# Patient Record
Sex: MALE | Employment: STUDENT
[De-identification: names, ages, dates, MRNs, and addresses within clinical notes are randomized per-mention and may not be internally consistent; named-entity substitution may affect disease eponyms.]

---

## 2024-06-04 ENCOUNTER — APPOINTMENT (OUTPATIENT)
Dept: RADIOLOGY | Facility: OTHER | Age: 24
End: 2024-06-04
Attending: FAMILY MEDICINE
Payer: OTHER MISCELLANEOUS

## 2024-06-04 DIAGNOSIS — M25.561 ACUTE PAIN OF RIGHT KNEE: ICD-10-CM

## 2024-06-04 PROCEDURE — 73564 X-RAY EXAM KNEE 4 OR MORE: CPT | Mod: TC,FY,RT | Performed by: RADIOLOGY

## 2024-06-13 DIAGNOSIS — M25.561 ACUTE PAIN OF RIGHT KNEE: ICD-10-CM

## 2024-06-17 ENCOUNTER — APPOINTMENT (OUTPATIENT)
Dept: RADIOLOGY | Facility: MEDICAL CENTER | Age: 24
End: 2024-06-17
Attending: FAMILY MEDICINE
Payer: OTHER MISCELLANEOUS

## 2024-06-17 PROCEDURE — 73721 MRI JNT OF LWR EXTRE W/O DYE: CPT | Mod: RT

## 2024-06-20 ENCOUNTER — OFFICE VISIT (OUTPATIENT)
Dept: MEDICAL GROUP | Facility: OTHER | Age: 24
End: 2024-06-20

## 2024-06-20 DIAGNOSIS — M17.11 OSTEOARTHRITIS OF RIGHT KNEE, UNSPECIFIED OSTEOARTHRITIS TYPE: ICD-10-CM

## 2024-06-20 PROCEDURE — 0232T NJX PLATELET PLASMA: CPT | Mod: GC | Performed by: STUDENT IN AN ORGANIZED HEALTH CARE EDUCATION/TRAINING PROGRAM

## 2024-06-20 NOTE — PROGRESS NOTES
Subjective:   Chas Monroe III is a 23 y.o. male here for the evaluation and management of No chief complaint on file.    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 He has been complaining of knee fullness and pain, and does have a large knee effusion. Reviewed treatment options with patient at last training room visit, and engaged in shared decision making regarding PRP, HA, and corticosteroid injections for management. Patient has had and responded well to PRP injections well in the past, and would like to proceed with PRP today.     ROS  As per HPI   No current outpatient medications on file.     No current facility-administered medications for this visit.     Allergies  Patient has no allergy information on record.    No past medical history on file.  There are no problems to display for this patient.      Past Surgical History  No past surgical history on file.    Social History     Socioeconomic History    Marital status: Single     Spouse name: Not on file    Number of children: Not on file    Years of education: Not on file    Highest education level: Not on file   Occupational History    Not on file   Tobacco Use    Smoking status: Not on file    Smokeless tobacco: Not on file   Substance and Sexual Activity    Alcohol use: Not on file    Drug use: Not on file    Sexual activity: Not on file   Other Topics Concern    Not on file   Social History Narrative    Not on file     Social Determinants of Health     Financial Resource Strain: Not on file   Food Insecurity: Not on file   Transportation Needs: Not on file   Physical Activity: Not on file   Stress: Not on file   Social Connections: Not on file   Intimate Partner Violence: Not on file   Housing Stability: Not on file        Objective:   There were no vitals filed for this visit.  There is no height or weight on file to calculate BMI.     Physical Exam  MSK: Large knee effusion on palpation.   POCUS:  Demonstrates large knee effusion.   Assessment and Plan:   Chas Monroe III is a 23 y.o. male with a No chief complaint on file.     The following was discussed with the patient today.    1. Osteoarthritis of right knee, unspecified osteoarthritis type    Problem List Items Addressed This Visit    None  Visit Diagnoses       Osteoarthritis of right knee, unspecified osteoarthritis type        Knee joint aspiration and PRP injection done. Tolerated well without complications. See note below.          Procedure Note    Procedure: R knee joint aspiration and PRP injection with ultrasound guidance  Performing physician: Riley Gamboa DO  Supervising physician: Spike Castillo MD  Injectate: 5.5cc of PRP    Details:  Patient provided verbal consent for procedure after discussion of risks and benefits. Utilizing ultrasound guidance, appropriate injection site was identified for superolateral approach. The area was then cleansed with chloraprep x3 in usual sterile fashion. A 25 gauge 1.5 in needle was then placed in superficial skin, and under ultrasound guidance 3cc of 2% lidocaine without epinephrine was administered. Needle was then removed, and site was re-cleansed with chloraprep. Once noting appropriate analgesia, an 18 gauge needle attached to empty 30cc syringe was then placed within joint space under ultrasound guidance, and then contents were aspirated. 30cc syringe was filled with serous fluid, removed, and replaced with a new syringe with an additional 10cc aspirated for a total of 40cc drained from the knee. Without removing the needle within the joint space, this syringe was removed, and a new syringe containing 5.5cc of PRP was then attached, and easily injected into the joint space under ultrasound guidance. The needle was removed, and then a bandage was applied. Aftercare was discussed. The patient tolerated the procedure well, and there were no complications. Images to be uploaded to PACS.     Riley Gamboa  DO  [employer] [facility]

## 2024-07-12 RX ORDER — LOPERAMIDE HYDROCHLORIDE 2 MG/1
2 CAPSULE ORAL 4 TIMES DAILY PRN
Qty: 30 CAPSULE | Refills: 0 | Status: SHIPPED | OUTPATIENT
Start: 2024-07-12

## 2024-07-12 RX ORDER — ONDANSETRON 4 MG/1
4 TABLET, ORALLY DISINTEGRATING ORAL EVERY 6 HOURS PRN
Qty: 10 TABLET | Refills: 0 | Status: SHIPPED | OUTPATIENT
Start: 2024-07-12

## 2024-07-26 DIAGNOSIS — M25.561 ACUTE PAIN OF RIGHT KNEE: ICD-10-CM

## 2024-07-26 DIAGNOSIS — M17.11 OSTEOARTHRITIS OF RIGHT KNEE, UNSPECIFIED OSTEOARTHRITIS TYPE: ICD-10-CM

## 2024-07-26 RX ORDER — MELOXICAM 15 MG/1
15 TABLET ORAL DAILY
Qty: 30 TABLET | Refills: 1 | Status: SHIPPED | OUTPATIENT
Start: 2024-07-26

## 2024-09-17 ENCOUNTER — APPOINTMENT (OUTPATIENT)
Dept: RADIOLOGY | Facility: OTHER | Age: 24
End: 2024-09-17
Attending: FAMILY MEDICINE
Payer: COMMERCIAL

## 2024-09-17 DIAGNOSIS — M25.511 ACUTE PAIN OF RIGHT SHOULDER: ICD-10-CM

## 2024-09-17 DIAGNOSIS — R07.89 CHEST PRESSURE: ICD-10-CM

## 2024-09-17 DIAGNOSIS — M89.8X1 PAIN OF RIGHT CLAVICLE: ICD-10-CM

## 2024-09-17 PROCEDURE — 73030 X-RAY EXAM OF SHOULDER: CPT | Mod: TC,FY,RT | Performed by: FAMILY MEDICINE

## 2024-09-17 PROCEDURE — 71046 X-RAY EXAM CHEST 2 VIEWS: CPT | Mod: TC,FY | Performed by: RADIOLOGY

## 2024-09-17 PROCEDURE — 73000 X-RAY EXAM OF COLLAR BONE: CPT | Mod: TC,FY,RT | Performed by: RADIOLOGY

## 2024-09-25 ENCOUNTER — APPOINTMENT (OUTPATIENT)
Dept: RADIOLOGY | Facility: OTHER | Age: 24
End: 2024-09-25
Attending: FAMILY MEDICINE
Payer: OTHER MISCELLANEOUS

## 2024-09-25 DIAGNOSIS — M79.645 PAIN IN FINGER OF LEFT HAND: ICD-10-CM

## 2024-09-25 PROCEDURE — 73140 X-RAY EXAM OF FINGER(S): CPT | Mod: TC,FY,LT | Performed by: RADIOLOGY

## 2024-10-14 ENCOUNTER — APPOINTMENT (OUTPATIENT)
Dept: RADIOLOGY | Facility: OTHER | Age: 24
End: 2024-10-14
Attending: FAMILY MEDICINE
Payer: OTHER MISCELLANEOUS

## 2024-10-14 DIAGNOSIS — M25.562 ACUTE PAIN OF LEFT KNEE: ICD-10-CM

## 2024-10-14 PROCEDURE — 73564 X-RAY EXAM KNEE 4 OR MORE: CPT | Mod: TC,FY,LT | Performed by: RADIOLOGY

## 2024-10-28 ENCOUNTER — APPOINTMENT (OUTPATIENT)
Dept: RADIOLOGY | Facility: OTHER | Age: 24
End: 2024-10-28
Attending: FAMILY MEDICINE
Payer: OTHER MISCELLANEOUS

## 2024-10-28 DIAGNOSIS — M25.562 ACUTE PAIN OF LEFT KNEE: ICD-10-CM

## 2024-10-28 PROCEDURE — 73564 X-RAY EXAM KNEE 4 OR MORE: CPT | Mod: TC,FY,LT | Performed by: RADIOLOGY

## 2024-10-30 ENCOUNTER — APPOINTMENT (OUTPATIENT)
Dept: RADIOLOGY | Facility: MEDICAL CENTER | Age: 24
End: 2024-10-30
Attending: STUDENT IN AN ORGANIZED HEALTH CARE EDUCATION/TRAINING PROGRAM
Payer: OTHER MISCELLANEOUS

## 2024-10-30 DIAGNOSIS — M25.562 ACUTE PAIN OF LEFT KNEE: ICD-10-CM

## 2024-10-30 PROCEDURE — 73721 MRI JNT OF LWR EXTRE W/O DYE: CPT | Mod: LT

## 2024-11-01 ENCOUNTER — PHARMACY VISIT (OUTPATIENT)
Dept: PHARMACY | Facility: MEDICAL CENTER | Age: 24
End: 2024-11-01
Payer: MEDICARE

## 2024-11-01 DIAGNOSIS — M25.562 ACUTE PAIN OF LEFT KNEE: ICD-10-CM

## 2024-11-01 PROCEDURE — RXMED WILLOW AMBULATORY MEDICATION CHARGE: Performed by: STUDENT IN AN ORGANIZED HEALTH CARE EDUCATION/TRAINING PROGRAM

## 2024-11-01 PROCEDURE — RXOTC WILLOW AMBULATORY OTC CHARGE: Performed by: PHARMACIST

## 2024-11-01 RX ORDER — INDOMETHACIN 50 MG/1
50 CAPSULE ORAL 3 TIMES DAILY
Qty: 30 CAPSULE | Refills: 0 | Status: SHIPPED | OUTPATIENT
Start: 2024-11-01